# Patient Record
Sex: FEMALE | Race: WHITE | NOT HISPANIC OR LATINO | Employment: OTHER | URBAN - METROPOLITAN AREA
[De-identification: names, ages, dates, MRNs, and addresses within clinical notes are randomized per-mention and may not be internally consistent; named-entity substitution may affect disease eponyms.]

---

## 2017-02-20 ENCOUNTER — OFFICE VISIT (OUTPATIENT)
Dept: OBGYN CLINIC | Facility: HOSPITAL | Age: 82
End: 2017-02-20
Payer: COMMERCIAL

## 2017-03-20 ENCOUNTER — GENERIC CONVERSION - ENCOUNTER (OUTPATIENT)
Dept: OTHER | Facility: OTHER | Age: 82
End: 2017-03-20

## 2017-03-25 ENCOUNTER — ALLSCRIPTS OFFICE VISIT (OUTPATIENT)
Dept: OTHER | Facility: OTHER | Age: 82
End: 2017-03-25

## 2017-03-25 DIAGNOSIS — R26.9 ABNORMALITY OF GAIT AND MOBILITY: ICD-10-CM

## 2017-03-30 ENCOUNTER — APPOINTMENT (OUTPATIENT)
Dept: PHYSICAL THERAPY | Facility: CLINIC | Age: 82
End: 2017-03-30
Payer: COMMERCIAL

## 2017-03-30 ENCOUNTER — GENERIC CONVERSION - ENCOUNTER (OUTPATIENT)
Dept: OTHER | Facility: OTHER | Age: 82
End: 2017-03-30

## 2017-03-30 PROCEDURE — 97162 PT EVAL MOD COMPLEX 30 MIN: CPT

## 2017-04-03 ENCOUNTER — GENERIC CONVERSION - ENCOUNTER (OUTPATIENT)
Dept: OTHER | Facility: OTHER | Age: 82
End: 2017-04-03

## 2017-04-03 LAB
LEFT EYE DIABETIC RETINOPATHY: NORMAL
RIGHT EYE DIABETIC RETINOPATHY: NORMAL

## 2017-04-05 ENCOUNTER — APPOINTMENT (OUTPATIENT)
Dept: PHYSICAL THERAPY | Facility: CLINIC | Age: 82
End: 2017-04-05
Payer: COMMERCIAL

## 2017-04-05 PROCEDURE — 97116 GAIT TRAINING THERAPY: CPT

## 2017-04-05 PROCEDURE — 97112 NEUROMUSCULAR REEDUCATION: CPT

## 2017-04-07 ENCOUNTER — APPOINTMENT (OUTPATIENT)
Dept: PHYSICAL THERAPY | Facility: CLINIC | Age: 82
End: 2017-04-07
Payer: COMMERCIAL

## 2017-04-10 ENCOUNTER — APPOINTMENT (OUTPATIENT)
Dept: PHYSICAL THERAPY | Facility: CLINIC | Age: 82
End: 2017-04-10
Payer: COMMERCIAL

## 2017-04-10 PROCEDURE — 97116 GAIT TRAINING THERAPY: CPT

## 2017-04-10 PROCEDURE — 97110 THERAPEUTIC EXERCISES: CPT

## 2017-04-12 ENCOUNTER — APPOINTMENT (OUTPATIENT)
Dept: PHYSICAL THERAPY | Facility: CLINIC | Age: 82
End: 2017-04-12
Payer: COMMERCIAL

## 2017-04-12 PROCEDURE — 97116 GAIT TRAINING THERAPY: CPT

## 2017-04-12 PROCEDURE — 97110 THERAPEUTIC EXERCISES: CPT

## 2017-04-17 ENCOUNTER — APPOINTMENT (OUTPATIENT)
Dept: PHYSICAL THERAPY | Facility: CLINIC | Age: 82
End: 2017-04-17
Payer: COMMERCIAL

## 2017-04-17 PROCEDURE — 97110 THERAPEUTIC EXERCISES: CPT

## 2017-04-17 PROCEDURE — 97116 GAIT TRAINING THERAPY: CPT

## 2017-04-17 PROCEDURE — 97112 NEUROMUSCULAR REEDUCATION: CPT

## 2017-04-19 ENCOUNTER — APPOINTMENT (OUTPATIENT)
Dept: PHYSICAL THERAPY | Facility: CLINIC | Age: 82
End: 2017-04-19
Payer: COMMERCIAL

## 2017-04-24 ENCOUNTER — APPOINTMENT (OUTPATIENT)
Dept: PHYSICAL THERAPY | Facility: CLINIC | Age: 82
End: 2017-04-24
Payer: COMMERCIAL

## 2017-04-24 PROCEDURE — 97112 NEUROMUSCULAR REEDUCATION: CPT

## 2017-04-24 PROCEDURE — 97110 THERAPEUTIC EXERCISES: CPT

## 2017-04-26 ENCOUNTER — APPOINTMENT (OUTPATIENT)
Dept: PHYSICAL THERAPY | Facility: CLINIC | Age: 82
End: 2017-04-26
Payer: COMMERCIAL

## 2017-04-26 PROCEDURE — 97110 THERAPEUTIC EXERCISES: CPT

## 2017-04-26 PROCEDURE — 97164 PT RE-EVAL EST PLAN CARE: CPT

## 2017-05-01 ENCOUNTER — APPOINTMENT (OUTPATIENT)
Dept: PHYSICAL THERAPY | Facility: CLINIC | Age: 82
End: 2017-05-01
Payer: COMMERCIAL

## 2017-05-01 DIAGNOSIS — E11.9 TYPE 2 DIABETES MELLITUS WITHOUT COMPLICATIONS (HCC): ICD-10-CM

## 2017-05-01 PROCEDURE — 97110 THERAPEUTIC EXERCISES: CPT

## 2017-05-01 PROCEDURE — 97116 GAIT TRAINING THERAPY: CPT

## 2017-05-12 ENCOUNTER — LAB CONVERSION - ENCOUNTER (OUTPATIENT)
Dept: OTHER | Facility: OTHER | Age: 82
End: 2017-05-12

## 2017-05-12 LAB
BUN SERPL-MCNC: 15 MG/DL (ref 7–25)
BUN/CREA RATIO (HISTORICAL): 16 (CALC) (ref 6–22)
CALCIUM SERPL-MCNC: 9.3 MG/DL (ref 8.6–10.4)
CHLORIDE SERPL-SCNC: 101 MMOL/L (ref 98–110)
CO2 SERPL-SCNC: 31 MMOL/L (ref 20–31)
CREAT SERPL-MCNC: 0.93 MG/DL (ref 0.6–0.88)
DEPRECATED RDW RBC AUTO: 14.3 % (ref 11–15)
EGFR AFRICAN AMERICAN (HISTORICAL): 60 ML/MIN/1.73M2
EGFR-AMERICAN CALC (HISTORICAL): 52 ML/MIN/1.73M2
GLUCOSE (HISTORICAL): 126 MG/DL (ref 65–99)
HBA1C MFR BLD HPLC: 6 % OF TOTAL HGB
HCT VFR BLD AUTO: 42.8 % (ref 35–45)
HGB BLD-MCNC: 14 G/DL (ref 11.7–15.5)
MCH RBC QN AUTO: 29.5 PG (ref 27–33)
MCHC RBC AUTO-ENTMCNC: 32.8 G/DL (ref 32–36)
MCV RBC AUTO: 89.8 FL (ref 80–100)
PLATELET # BLD AUTO: 185 THOUSAND/UL (ref 140–400)
PMV BLD AUTO: 9.7 FL (ref 7.5–12.5)
POTASSIUM SERPL-SCNC: 4.4 MMOL/L (ref 3.5–5.3)
RBC # BLD AUTO: 4.76 MILLION/UL (ref 3.8–5.1)
SODIUM SERPL-SCNC: 139 MMOL/L (ref 135–146)
WBC # BLD AUTO: 6.5 THOUSAND/UL (ref 3.8–10.8)

## 2017-05-15 ENCOUNTER — LAB CONVERSION - ENCOUNTER (OUTPATIENT)
Dept: OTHER | Facility: OTHER | Age: 82
End: 2017-05-15

## 2017-05-15 ENCOUNTER — OFFICE VISIT (OUTPATIENT)
Dept: OBGYN CLINIC | Facility: HOSPITAL | Age: 82
End: 2017-05-15
Payer: COMMERCIAL

## 2017-05-15 LAB
CREATININE, RANDOM URINE (HISTORICAL): 107 MG/DL (ref 20–320)
MAGNESIUM, UR (HISTORICAL): 0.7 MG/DL
MICROALBUMIN/CREATININE RATIO (HISTORICAL): 7 MCG/MG CREAT

## 2017-05-16 ENCOUNTER — GENERIC CONVERSION - ENCOUNTER (OUTPATIENT)
Dept: OTHER | Facility: OTHER | Age: 82
End: 2017-05-16

## 2017-06-28 ENCOUNTER — OFFICE VISIT (OUTPATIENT)
Dept: OBGYN CLINIC | Facility: HOSPITAL | Age: 82
End: 2017-06-28
Payer: COMMERCIAL

## 2017-07-03 ENCOUNTER — OFFICE VISIT (OUTPATIENT)
Dept: OBGYN CLINIC | Facility: HOSPITAL | Age: 82
End: 2017-07-03
Payer: COMMERCIAL

## 2017-08-07 ENCOUNTER — GENERIC CONVERSION - ENCOUNTER (OUTPATIENT)
Dept: OTHER | Facility: OTHER | Age: 82
End: 2017-08-07

## 2017-08-14 ENCOUNTER — OFFICE VISIT (OUTPATIENT)
Dept: OBGYN CLINIC | Facility: HOSPITAL | Age: 82
End: 2017-08-14
Payer: COMMERCIAL

## 2017-08-15 ENCOUNTER — OFFICE VISIT (OUTPATIENT)
Dept: OBGYN CLINIC | Facility: HOSPITAL | Age: 82
End: 2017-08-15
Payer: COMMERCIAL

## 2017-09-07 ENCOUNTER — GENERIC CONVERSION - ENCOUNTER (OUTPATIENT)
Dept: OTHER | Facility: OTHER | Age: 82
End: 2017-09-07

## 2017-09-20 ENCOUNTER — LAB CONVERSION - ENCOUNTER (OUTPATIENT)
Dept: OTHER | Facility: OTHER | Age: 82
End: 2017-09-20

## 2017-09-20 LAB — HBA1C MFR BLD HPLC: 6 % OF TOTAL HGB

## 2017-10-04 ENCOUNTER — GENERIC CONVERSION - ENCOUNTER (OUTPATIENT)
Dept: OTHER | Facility: OTHER | Age: 82
End: 2017-10-04

## 2017-10-16 ENCOUNTER — ALLSCRIPTS OFFICE VISIT (OUTPATIENT)
Dept: OTHER | Facility: OTHER | Age: 82
End: 2017-10-16

## 2017-10-16 ENCOUNTER — HOSPITAL ENCOUNTER (OUTPATIENT)
Dept: RADIOLOGY | Facility: HOSPITAL | Age: 82
Discharge: HOME | End: 2017-10-16
Attending: ORTHOPAEDIC SURGERY
Payer: COMMERCIAL

## 2017-10-16 DIAGNOSIS — M25.561 PAIN IN RIGHT KNEE: ICD-10-CM

## 2017-10-16 PROCEDURE — 73560 X-RAY EXAM OF KNEE 1 OR 2: CPT

## 2017-10-17 NOTE — PROGRESS NOTES
Assessment  1  Right knee pain (719 46) (M25 561)   2  Closed nondisplaced transverse fracture of right patella, initial encounter (822 0)   (P99 394Y)    Plan  Closed nondisplaced transverse fracture of right patella, initial encounter    · Follow-up visit in 3 weeks Evaluation and Treatment  Follow-up  Status: Hold For -  Scheduling  Requested for: 43RPL1564  Right knee pain    · * XR KNEE 3 VW LEFT NON INJURY; Status:Active; Requested for:16Oct2017;    · * XR KNEE 4+ VW RIGHT INJURY; Status:Active; Requested for:16Oct2017;     Discussion/Summary    Patient will be treated with nonoperative care  A detailed note to the nursing facility was provided at the findings the treatment  She will maintain the knee immobilizer for weeks, removing it for high general care and for showering, when she is out of the knee immobilizer she should be nonweightbearing and try to keep the knee straight  She otherwise can bear weight with knee immobilizer on  We will check her with new x-rays in 3 weeks and hopefully begin physical therapy  History of Present Illness  Patient presents with a chief complaint of right knee pain  She fell 10 days ago landing on the right knee and was seen at [facility] diagnosed with patella fracture and placed in a knee immobilizer  She presents today from her assisted living home in the knee immobilizer complaining of minimal pain and inability to raise the leg off of the chair/  She denies numbness or tingling or fevers and chills associated with this, localizes the pain to the patella and states is mild in intensity intermittent in timing      Review of Systems    Constitutional: No fever, no chills, feels well, no tiredness, no recent weight gain or loss  Eyes: No complaints of eyesight problems, no red eyes  ENT: no loss of hearing, no nosebleeds, no sore throat  Cardiovascular: No complaints of chest pain, no palpitations, no leg claudication or lower extremity edema  Respiratory: no compliants of shortness of breath, no wheezing, no cough  Gastrointestinal: no complaints of abdominal pain, no constipation, no nausea or diarrhea, no vomiting, no bloody stools  Genitourinary: no complaints of dysuria, no incontinence  Musculoskeletal: as noted in HPI  Integumentary: no complaints of skin rash or lesion, no itching or dry skin, no skin wounds  Neurological: no complaints of headache, no confusion, no numbness or tingling, no dizziness  Endocrine: No complaints of muscle weakness, no feelings of weakness, no frequent urination, no excessive thirst    Psychiatric: No suicidal thoughts, no anxiety, no feelings of depression  ROS reviewed  Active Problems  1  Balance problem (781 99) (R26 89)   2  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 (250 60,357 2) (E11 40)   3  Gait disturbance (781 2) (R26 9)   4  Hypercholesterolemia (272 0) (E78 00)   5  Non-toxic uninodular goiter (241 0) (E04 1)   6  Osteoporosis (733 00) (M81 0)   7  Psoriasis (696 1) (L40 9)   8  Right knee pain (719 46) (M25 561)   9  Urinary frequency (788 41) (R35 0)   10   Vitamin D deficiency (268 9) (E55 9)    Past Medical History   · History of Ankle injury, left, initial encounter (959 7) (E28 078P)   · History of Anxiety (300 00) (F41 9)   · History of Breast pain, left (611 71) (N64 4)   · History of Compression fracture of L1 lumbar vertebra (805 4) (S32 010A)   · History of Coronary Artery Disease (V12 59)   · History of Gout (274 9) (M10 9)   · History of abnormal weight loss (V13 89) (O51 168)   · History of depression (V11 8) (Z86 59)   · History of edema (V13 89) (Z30 654)   · History of hypertension (V12 59) (Z86 79)   · History of low back pain (V13 59) (Z87 39)   · History of Lung mass (786 6) (R91 8)   · History of Polyp of sigmoid colon (211 3) (D12 5)   · History of Reported Positive Skin Test For Tuberculosis    The active problems and past medical history were reviewed and updated today  Surgical History   · History of Cataract Surgery   · History of Colonoscopy (Fiberoptic)   · History of Surgery Excision Of Parotid Tumor/Gland   · History of Thyroid Surgery Benedict-Thyroidectomy    The surgical history was reviewed and updated today  Family History  Mother    · Family history of Diabetes Mellitus (V18 0)   · Family history of Myocardial Infarction Arrhythmias  Father    · Family history of Myocardial Infarction Arrhythmias  Brother    · Family history of CABG   · Family history of Myocardial Infarction Arrhythmias    The family history was reviewed and updated today  Social History   · Denied: Alcohol Use (History)   · Death In The Family Spouse   ·   01   · Denied: Drug Use (305 90)   · Former smoker (V15 82) (C81 377)   · quit age 36   · Marital History -   The social history was reviewed and updated today  Current Meds   1  Acetaminophen 325 MG Oral Tablet; Therapy: (Recorded:13Oma8321) to Recorded   2  AmLODIPine Besylate 5 MG Oral Tablet; Therapy: (Recorded:20Zuy5249) to Recorded   3  Aspirin 81 MG CAPS; Therapy: (Recorded:06Nsm4402) to Recorded   4  Aspirin 81 MG Oral Tablet Delayed Release; Take 1 daily; Therapy: 53NCT1401 to Recorded   5  Combigan SOLN;   Therapy: (Recorded:81Irn6614) to Recorded   6  Folic Acid 1 MG Oral Tablet; Therapy: (Recorded:79Cnb5321) to Recorded   7  Meclizine HCl - 25 MG Oral Tablet; 1/2 tablet daily at bedtime; Therapy: 86CPB2573 to (Evaluate:64Yvo8016)  Requested for: 07PFX9044; Last   Rx:63Ike5034 Ordered   8  MiraLax Oral Powder (Polyethylene Glycol 3350); Therapy: (Recorded:25Sfn3820) to Recorded   9  Multivitamins CAPS; Therapy: (Recorded:33Qlq4821) to Recorded   10  Remeron 15 MG Oral Tablet (Mirtazapine); Therapy: (Recorded:67Rwy0581) to Recorded   11  Travatan Z 0 004 % Ophthalmic Solution; Therapy: (Recorded:88Hbq7522) to Recorded   12   Travatan Z 0 004 % Ophthalmic Solution; Therapy: 26Jyw0224 to (Evaluate:19Oct2014) Recorded   13  Ultram 50 MG Oral Tablet (TraMADol HCl); Therapy: (Recorded:16Oct2017) to Recorded   14  Vitamin D TABS; Therapy: (Recorded:32Poi7453) to Recorded    The medication list was reviewed and updated today  Allergies  1  Penicillins   2  Glucophage TABS   3  Lipitor TABS   4  Lisinopril TABS    Vitals   Recorded: 50CEP1767 11:32AM   Heart Rate 72   Systolic 214   Diastolic 66   Height Unobtainable Yes   Weight Unobtainable Yes     Physical Exam    Right Knee: Appearance: no deformity,-- no joint dislocation,-- no ecchymosis,-- no effusion-- and-- no erythema  Tenderness: diffuse anterior knee, but-- not over the lateral joint line-- and-- not over the medial joint line  Palpatory findings include no crepitus-- and-- no patella ballottement  ROM: Deferred  Motor: Deferred  -- Unable to do straight leg raise  Constitutional - General appearance: Normal  appearance reflects stated age  Musculoskeletal - Gait and station: Abnormal  Gait evaluation demonstrated In a wheelchair, but-- weight bearing on the right  Cardiovascular - Pulses: Normal    Skin - Skin and subcutaneous tissue: Normal    Neurologic - Sensation: Normal    Psychiatric - Mood and affect: Normal       Results/Data  I personally reviewed the films/images/results in the office today  My interpretation follows  X-ray Review 2 views right knee obtained today show a nondisplaced fracture of the patella  Future Appointments    Date/Time Provider Specialty Site   10/18/2017 03:30 PM BREEZY Davies   Cardiology [facility]     Signatures   Electronically signed by : BREEZY Epstein ; Oct 16 2017 11:51AM EST                       (Author)

## 2017-11-06 ENCOUNTER — HOSPITAL ENCOUNTER (OUTPATIENT)
Dept: RADIOLOGY | Facility: HOSPITAL | Age: 82
Discharge: HOME | End: 2017-11-06
Attending: ORTHOPAEDIC SURGERY
Payer: COMMERCIAL

## 2017-11-06 ENCOUNTER — GENERIC CONVERSION - ENCOUNTER (OUTPATIENT)
Dept: OTHER | Facility: OTHER | Age: 82
End: 2017-11-06

## 2017-11-06 ENCOUNTER — ALLSCRIPTS OFFICE VISIT (OUTPATIENT)
Dept: OTHER | Facility: OTHER | Age: 82
End: 2017-11-06

## 2017-11-06 DIAGNOSIS — S82.034A: ICD-10-CM

## 2017-11-06 PROCEDURE — 73560 X-RAY EXAM OF KNEE 1 OR 2: CPT

## 2017-11-29 ENCOUNTER — ALLSCRIPTS OFFICE VISIT (OUTPATIENT)
Dept: OTHER | Facility: OTHER | Age: 82
End: 2017-11-29

## 2017-11-30 NOTE — PROGRESS NOTES
Assessment  Assessed    1  Hypercholesterolemia (272 0) (E78 00)    Plan  Balance problem, Closed nondisplaced transverse fracture of right patella, initialencounter, 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, Health Maintenance,Hypercholesterolemia    · Follow-up PRN Evaluation and Treatment  Follow-up  Status: Complete  Done:29Nov2017 03:21PM   Ordered; For: Balance problem, Closed nondisplaced transverse fracture of right patella, initial encounter, 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, Health Maintenance, Hypercholesterolemia; Ordered By: Janene Mccloud Performed:  Due: 88BAK5486   · (1) CBC/PLT/DIFF; Status:Active; Requested for:29Nov2018; Perform:[facility]; MVH:46WTS0305;QQWSXBQ; For:Balance problem, Closed nondisplaced transverse fracture of right patella, initial encounter, 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, Health Maintenance, Hypercholesterolemia; Ordered By:Nico Mayer;   · (1) COMPREHENSIVE METABOLIC PANEL; Status:Active; Requested for:29Nov2018; Perform:[facility]; CHV:74FGD3519;XUJZPYJ; For:Balance problem, Closed nondisplaced transverse fracture of right patella, initial encounter, 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, Health Maintenance, Hypercholesterolemia; Ordered By:Nico Mayer;   · (1) HEMOGLOBIN A1C; Status:Active; Requested for:29Nov2018; Perform:[facility]; ARSEN:60PFQ4274;XOVRUHZ;OMLXEYI, Closed nondisplaced transverse fracture of right patella, initial encounter, 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, Health Maintenance, Hypercholesterolemia; Ordered By:Dayne Mayer;    Discussion/Summary  Cardiology Discussion Summary Free Text Note Form [facility]:   80-year-old woman continues to extraordinarily well  Mentally stay still sharp  We have treated her in the past for some elevated cholesterol levels   However she's developed no vascular disease apparently she had problems with statins  She basically is taking no cardiac drugs  We will see her in one year or when necessary  Her blood work is being done by her family physician  seen October 20, 2015  Have nothing to add to the care of this 89 for wound totally normal blood work sent for elevated cholesterol  No vascular disease  She has a high HDL  She is losing a little bit of weight  She is free he thinks because she is concerned about her blood sugar going to high  seen October 3, 2016  She continues to be doing extraordinarily well for her age  Nothing to change  Her hemoglobin A1c is 6 5  No medication  seen November 29, 2017  No cardiac issues  We will see her on a p r n  basis  Mentally amazingly stable  Chief Complaint  Chief Complaint Free Text Note Form: Patient presents for a yearly follow up  She has no complaints today  History of Present Illness  Cardiology HPI Free Text Note Form [facility]: Patient seen November 29, 2017  Is almost 40-year-old woman who has enjoyed extremely good health is visiting today  She had been on some statin drug  She is having problems with that  She is on no cardiac drugs  She has no cardiac symptoms  Mentally she is great  I will see her on a p r n  basis  She has family physician  Her A1c is 6  Her lab values in general are amazingly normal       Review of Systems  Cardiology Female ROS:    Cardiac: No complaints of chest pain, no palpitations, no fainting  Skin: No complaints of nonhealing sores or skin rash  Psychological: No complaints of feeling depressed, anxiety, panic attacks, or difficulty concentrating  Active Problems  Problems    1  Balance problem (781 99) (R26 89)   2  Closed nondisplaced transverse fracture of right patella, initial encounter (822 0) (S82 034A)   3  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 (250 60,357 2) (E11 40)   4  Gait disturbance (781 2) (R26 9)   5  Hypercholesterolemia (272 0) (E78 00)   6   Non-toxic uninodular goiter (241 0) (E04 1)   7  Osteoporosis (733 00) (M81 0)   8  Psoriasis (696 1) (L40 9)   9  Right knee pain (719 46) (M25 561)   10  Urinary frequency (788 41) (R35 0)   11  Vitamin D deficiency (268 9) (E55 9)    Past Medical History  Problems    1  History of Ankle injury, left, initial encounter (959 7) (D04 057V)   2  History of Anxiety (300 00) (F41 9)   3  History of Breast pain, left (611 71) (N64 4)   4  History of Compression fracture of L1 lumbar vertebra (805 4) (S32 010A)   5  History of Coronary Artery Disease (V12 59)   6  History of Gout (274 9) (M10 9)   7  History of abnormal weight loss (V13 89) (Z87 898)   8  History of depression (V11 8) (Z86 59)   9  History of edema (V13 89) (Z87 898)   10  History of hypertension (V12 59) (Z86 79)   11  History of low back pain (V13 59) (Z87 39)   12  History of Lung mass (786 6) (R91 8)   13  History of Polyp of sigmoid colon (211 3) (D12 5)   14  History of Reported Positive Skin Test For Tuberculosis    Surgical History  Problems    1  History of Cataract Surgery   2  History of Colonoscopy (Fiberoptic)   3  History of Surgery Excision Of Parotid Tumor/Gland   4  History of Thyroid Surgery Benedict-Thyroidectomy    Family History  Mother    1  Family history of Diabetes Mellitus (V18 0)   2  Family history of Myocardial Infarction Arrhythmias  Father    3  Family history of Myocardial Infarction Arrhythmias  Brother    4  Family history of CABG   5  Family history of Myocardial Infarction Arrhythmias    Social History  Problems    · Denied: Alcohol Use (History)   · Death In The Family Spouse   · Denied: Drug Use (305 90)   · Former smoker (X19 60) (N37 765)   · Marital History -   Social History Reviewed: The social history was reviewed and is unchanged  Current Meds   1  Acetaminophen 325 MG Oral Tablet; Therapy: (Recorded:16Oct2017) to Recorded   2  Aspirin 81 MG CAPS; Therapy: (Recorded:16Oct2017) to Recorded   3  MiraLax Oral Powder;  Therapy: (Recorded:16Oct2017) to Recorded   4  Multivitamins CAPS; Therapy: (Recorded:25Oct2016) to Recorded   5  Remeron 15 MG Oral Tablet; Therapy: (Recorded:16Oct2017) to Recorded   6  Travatan Z 0 004 % Ophthalmic Solution; Therapy: (Recorded:16Oct2017) to Recorded    Allergies  Medication    1  Penicillins   2  Glucophage TABS   3  Lipitor TABS   4  Lisinopril TABS    Vitals  Vital Signs    Recorded: 61WFH8821 02:50PM   Heart Rate 88   Systolic 881, LUE, Sitting   Diastolic 78, LUE, Sitting   Height 5 ft    Weight 101 lb 4 oz   BMI Calculated 19 77   BSA Calculated 1 4   O2 Saturation 97       Physical Exam   Constitutional  General appearance: Abnormal  -- Lost some weight but otherwise great  Pulmonary  Auscultation of lungs: Clear to auscultation, no rales, no rhonchi, no wheezing, good air movement  Cardiovascular  Auscultation of heart: Normal rate and rhythm, normal S1 and S2, no murmurs     Examination of extremities for edema and/or varicosities: Normal    Psychiatric - Orientation to person, place, and time: Normal -- Mood and affect: Normal       Signatures   Electronically signed by : BREEZY Clarke ; Nov 29 2017  3:25PM EST                       (Author)

## 2018-01-10 NOTE — PROGRESS NOTES
Assessment    1  Low back pain (724 2) (M54 5)   2  Compression fracture of L1 lumbar vertebra (805 4) (S32 010A)   3  Osteoporosis (733 00) (M81 0)    Plan  Low back pain    · Physical Therapy Referral Other Physician Referral  Consult  Status: Hold For -  Scheduling  Requested for: 65EWZ0101  are Referring to a non- Preferred Provider : Established Patient  Care Summary provided  : Yes    Discussion/Summary    1  Low back pain / H/o L1 compression Fx - take Tylenol PRN for pain  Patient was referred to physical therapy  Recommended to call office if low back pain persists or worsens  2 Osteoporosis -discussed fall precautions with patient  Continue weightbearing exercise, calcium with vitamin D supplements  Followup with PCP Dr Darwin Grady  The patient was counseled regarding instructions for management, risk factor reductions, risks and benefits of treatment options, importance of compliance with treatment  Possible side effects of new medications were reviewed with the patient/guardian today  The treatment plan was reviewed with the patient/guardian  The patient/guardian understands and agrees with the treatment plan      Chief Complaint  Patient is here complaining of lower back pain that started after falling at the end of November  Patient states she doesn't know how she fell, but she fell on her back  History of Present Illness  HPI: Patient is 43-year-old female who presents to the office c/o low back pain since fall in November 2015  She was admitted to [facility], diagnosed with L1 compression fracture  Patient is interested in starting physical therapy  She takes Tylenol PRN with some improvement in pain  No numbness, tingling in lower extremities  No abdominal pain, no constipation  Denies urinary symptoms  Patient is independent with all ADLs, continues to drive a car  Review of Systems    Constitutional: no fever and no chills     Cardiovascular: no chest pain, no intermittent leg claudication, no palpitations and no lower extremity edema  Respiratory: no shortness of breath, no cough and no wheezing  Gastrointestinal: no abdominal pain, no nausea, no vomiting, no constipation, no diarrhea and no blood in stools  Genitourinary: no dysuria and no pelvic pain  Musculoskeletal: no joint swelling    The patient presents with complaints of lower back arthralgias  Integumentary: no rashes and no itching  Neurological: no headache and no dizziness  Active Problems    1  Abnormal loss of weight (783 21) (R63 4)   2  Breast pain, left (611 71) (N64 4)   3  Controlled diabetes mellitus type II without complication (712 07) (R58 2)   4  Depression screening (V79 0) (Z13 89)   5  Hypercholesterolemia (272 0) (E78 0)   6  Hypertension (401 9) (I10)   7  Lung mass (786 6) (R91 8)   8  Need for influenza vaccination (V04 81) (Z23)   9  Need for pneumococcal vaccination (V03 82) (Z23)   10  Non-toxic uninodular goiter (241 0) (E04 1)   11  Osteoporosis (733 00) (M81 0)   12  Psoriasis (696 1) (L40 9)   13  Screening for genitourinary condition (V81 6) (Z13 89)   14  Vitamin D deficiency (268 9) (E55 9)    Past Medical History    1  History of Ankle injury, left, initial encounter (959 7) (V17 537W)   2  History of Anxiety (300 00) (F41 9)   3  History of Coronary Artery Disease (V12 59)   4  History of Gout (274 9) (M10 9)   5  History of acute bronchitis (V12 69) (Z87 09)   6  History of depression (V11 8) (Z86 59)   7  History of edema (V13 89) (Z87 898)   8  History of vertigo (V12 49) (Z87 898)   9  History of Polyp of sigmoid colon (211 3) (D12 5)   10  History of Pure hypercholesterolemia (272 0) (E78 0)   11  History of Reported Positive Skin Test For Tuberculosis  Active Problems And Past Medical History Reviewed: The active problems and past medical history were reviewed and updated today  Family History    1  Family history of Diabetes Mellitus (V18 0)   2  Family history of Myocardial Infarction Arrhythmias    3  Family history of Myocardial Infarction Arrhythmias    4  Family history of CABG   5  Family history of Myocardial Infarction Arrhythmias    Social History    · Denied: Alcohol Use (History)   · Death In The Family Spouse   ·   01   · Denied: Drug Use (305 90)   · Former smoker (V11 82) (F95 191)   · quit age 36   · Marital History -   The social history was reviewed and updated today  Surgical History    1  History of Cataract Surgery   2  History of Colonoscopy (Fiberoptic)   3  History of Surgery Excision Of Parotid Tumor/Gland   4  History of Thyroid Surgery Benedict-Thyroidectomy    Current Meds   1  Aspirin 81 MG Oral Tablet Delayed Release; Take 1 daily; Therapy: 87BXK0994 to Recorded   2  Meclizine HCl - 25 MG Oral Tablet; 1/2 tablet daily at bedtime; Therapy: 18NXZ1908 to (Evaluate:2014)  Requested for: 73RVX9665; Last   Rx:60Owx5318 Ordered   3  Multivitamins CAPS; Therapy: ((23) 1412-7927) to Recorded   4  Travatan Z 0 004 % Ophthalmic Solution; Therapy: 39Xeq9050 to (Evaluate:2014) Recorded   5  Vitamin D TABS; Therapy: (Recorded:2014) to Recorded    The medication list was reviewed and updated today  Allergies    1  Penicillins   2  Glucophage TABS   3  Lipitor TABS   4  Lisinopril TABS    Vitals   Recorded: 94IWJ8976 09:19AM   Temperature 97 5 F   Heart Rate 82   Respiration 16   Systolic 282   Diastolic 70   Height 5 ft    Weight 104 lb 4 00 oz   BMI Calculated 20 36   BSA Calculated 1 41   O2 Saturation 99   Pain Scale 8     Physical Exam    Constitutional thin elderly female in no acute distress  Eyes   Conjunctiva and lids: No swelling, erythema or discharge  Pupils and irises: Equal, round and reactive to light  Pulmonary   Respiratory effort: No increased work of breathing or signs of respiratory distress  Auscultation of lungs: Clear to auscultation      Cardiovascular Auscultation of heart: Normal rate and rhythm, normal S1 and S2, without murmurs  Examination of extremities for edema and/or varicosities: Normal     Abdomen   Abdomen: Non-tender, no masses  Liver and spleen: No hepatomegaly or splenomegaly  Musculoskeletal   Gait and station: Normal     Digits and nails: Normal without clubbing or cyanosis  Inspection/palpation of joints, bones, and muscles: Abnormal    Low back: decreased ROM with flexion, extension, lateral bending  No spinal or paraspinal tenderness  Thoracic kyphosis  Skin   Skin and subcutaneous tissue: Normal without rashes or lesions  Psychiatric   Orientation to person, place, and time: Normal     Mood and affect: Normal          Future Appointments    Date/Time Provider Specialty Site   05/05/2016 03:40 PM BREEZY Carlson   Family Medicine [facility]     Signatures   Electronically signed by : BREEZY Bonilla ; Jan 19 2016  9:57AM EST                       (Author)

## 2018-01-12 VITALS
OXYGEN SATURATION: 97 % | DIASTOLIC BLOOD PRESSURE: 78 MMHG | WEIGHT: 101.25 LBS | BODY MASS INDEX: 19.88 KG/M2 | SYSTOLIC BLOOD PRESSURE: 122 MMHG | HEIGHT: 60 IN | HEART RATE: 88 BPM

## 2018-01-12 NOTE — CONSULTS
Chief Complaint  Patient presents for a yearly follow up  She has no complaints today  History of Present Illness  Patient seen November 29, 2017  Is almost 80-year-old woman who has enjoyed extremely good health is visiting today  She had been on some statin drug  She is having problems with that  She is on no cardiac drugs  She has no cardiac symptoms  Mentally she is great  I will see her on a p r n  basis  She has family physician  Her A1c is 6  Her lab values in general are amazingly normal       Review of Systems      Cardiac: No complaints of chest pain, no palpitations, no fainting  Skin: No complaints of nonhealing sores or skin rash  Psychological: No complaints of feeling depressed, anxiety, panic attacks, or difficulty concentrating  Active Problems    1  Balance problem (781 99) (R26 89)   2  Closed nondisplaced transverse fracture of right patella, initial encounter (822 0)   (S82 034A)   3  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 (250 60,357 2) (E11 40)   4  Gait disturbance (781 2) (R26 9)   5  Hypercholesterolemia (272 0) (E78 00)   6  Non-toxic uninodular goiter (241 0) (E04 1)   7  Osteoporosis (733 00) (M81 0)   8  Psoriasis (696 1) (L40 9)   9  Right knee pain (719 46) (M25 561)   10  Urinary frequency (788 41) (R35 0)   11   Vitamin D deficiency (268 9) (E55 9)    Past Medical History    · History of Ankle injury, left, initial encounter (959 7) (B83 974Z)   · History of Anxiety (300 00) (F41 9)   · History of Breast pain, left (611 71) (N64 4)   · History of Compression fracture of L1 lumbar vertebra (805 4) (S32 010A)   · History of Coronary Artery Disease (V12 59)   · History of Gout (274 9) (M10 9)   · History of abnormal weight loss (V13 89) (V67 884)   · History of depression (V11 8) (Z86 59)   · History of edema (V13 89) (W18 907)   · History of hypertension (V12 59) (Z86 79)   · History of low back pain (V13 59) (Z87 39)   · History of Lung mass (786 6) (R91 8)   · History of Polyp of sigmoid colon (211 3) (D12 5)   · History of Reported Positive Skin Test For Tuberculosis    Surgical History    · History of Cataract Surgery   · History of Colonoscopy (Fiberoptic)   · History of Surgery Excision Of Parotid Tumor/Gland   · History of Thyroid Surgery Benedict-Thyroidectomy    Family History    · Family history of Diabetes Mellitus (V18 0)   · Family history of Myocardial Infarction Arrhythmias    · Family history of Myocardial Infarction Arrhythmias    · Family history of CABG   · Family history of Myocardial Infarction Arrhythmias    Social History    · Denied: Alcohol Use (History)   · Death In The Family Spouse   · Denied: Drug Use (305 90)   · Former smoker (V15 82) (L66 737)   · Marital History -   The social history was reviewed and is unchanged  Current Meds   1  Acetaminophen 325 MG Oral Tablet; Therapy: (Recorded:16Oct2017) to Recorded   2  Aspirin 81 MG CAPS; Therapy: (Recorded:16Oct2017) to Recorded   3  MiraLax Oral Powder; Therapy: (Recorded:16Oct2017) to Recorded   4  Multivitamins CAPS; Therapy: (Recorded:61Xgr0716) to Recorded   5  Remeron 15 MG Oral Tablet; Therapy: (Recorded:80Wfv2648) to Recorded   6  Travatan Z 0 004 % Ophthalmic Solution; Therapy: (Recorded:70Xxc7149) to Recorded    Allergies    1  Penicillins   2  Glucophage TABS   3  Lipitor TABS   4  Lisinopril TABS    Vitals   Recorded: [id number] 02:50PM   Heart Rate 88   Systolic 692, LUE, Sitting   Diastolic 78, LUE, Sitting   Height 5 ft    Weight 101 lb 4 oz   BMI Calculated 19 77   BSA Calculated 1 4   O2 Saturation 97     Physical Exam    Constitutional   General appearance: Abnormal   Lost some weight but otherwise great  Pulmonary   Auscultation of lungs: Clear to auscultation, no rales, no rhonchi, no wheezing, good air movement  Cardiovascular   Auscultation of heart: Normal rate and rhythm, normal S1 and S2, no murmurs      Examination of extremities for edema and/or varicosities: Normal     Psychiatric - Orientation to person, place, and time: Normal  Mood and affect: Normal       Assessment    1  Hypercholesterolemia (272 0) (E78 00)    Plan  Balance problem, Closed nondisplaced transverse fracture of right patella, initial  encounter, 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, Health Maintenance,  Hypercholesterolemia    · Follow-up PRN Evaluation and Treatment  Follow-up  Status: Complete  Done:  [id number] 03:21PM   Ordered; For: Balance problem, Closed nondisplaced transverse fracture of right patella, initial encounter, 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, Health Maintenance, Hypercholesterolemia; Ordered By: Patricia Guerrero Performed:  Due: [id number]   · (1) CBC/PLT/DIFF; Status:Active; Requested for:29Nov2018; Perform:[facility]; OEP:[id number]; For:Balance problem, Closed nondisplaced transverse fracture of right patella, initial encounter, 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, Health Maintenance, Hypercholesterolemia; Ordered By:Nico Mayer;   · (1) COMPREHENSIVE METABOLIC PANEL; Status:Active; Requested for:29Nov2018; Perform:[facility]; HSO:[id number]; For:Balance problem, Closed nondisplaced transverse fracture of right patella, initial encounter, 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, Health Maintenance, Hypercholesterolemia; Ordered By:Nico Mayer;   · (1) HEMOGLOBIN A1C; Status:Active; Requested for:29Nov2018; Perform:[facility]; XNL:[id number];BIMPWNP; For:Balance problem, Closed nondisplaced transverse fracture of right patella, initial encounter, 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, Health Maintenance, Hypercholesterolemia; Ordered By:Nahum Mayer;    Discussion/Summary    49-year-old woman continues to extraordinarily well  Mentally stay still sharp   We have treated her in the past for some elevated cholesterol levels  However she's developed no vascular disease apparently she had problems with statins  She basically is taking no cardiac drugs  We will see her in one year or when necessary  Her blood work is being done by her family physician    Patient seen October 20, 2015  Have nothing to add to the care of this 89 for wound totally normal blood work sent for elevated cholesterol  No vascular disease  She has a high HDL  She is losing a little bit of weight  She is free he thinks because she is concerned about her blood sugar going to high  Recent seen October 3, 2016  She continues to be doing extraordinarily well for her age  Nothing to change  Her hemoglobin A1c is 6 5  No medication    Patient seen November 29, 2017  No cardiac issues  We will see her on a p r n  basis  Mentally amazingly stable        Signatures   Electronically signed by : BREEZY Zamora ; Nov 29 2017  3:25PM EST                       (Author)

## 2018-01-13 VITALS — DIASTOLIC BLOOD PRESSURE: 66 MMHG | SYSTOLIC BLOOD PRESSURE: 154 MMHG | HEART RATE: 72 BPM

## 2018-01-14 VITALS
TEMPERATURE: 97.2 F | DIASTOLIC BLOOD PRESSURE: 66 MMHG | SYSTOLIC BLOOD PRESSURE: 124 MMHG | HEART RATE: 60 BPM | RESPIRATION RATE: 16 BRPM | WEIGHT: 103.01 LBS | HEIGHT: 60 IN | BODY MASS INDEX: 20.22 KG/M2

## 2018-01-15 NOTE — RESULT NOTES
Verified Results  (1) CBC/PLT/DIFF [id number] 09:29AM [facility] Ramirezbaldev     Test Name Result Flag Reference   WHITE BLOOD CELL COUNT 8 8 Thousand/uL  3 8-10 8   RED BLOOD CELL COUNT 4 64 Million/uL  3 80-5 10   HEMOGLOBIN 13 7 g/dL  11 7-15 5   HEMATOCRIT 42 3 %  35 0-45 0   MCV 91 3 fL  80 0-100 0   MCH 29 5 pg  27 0-33 0   MCHC 32 3 g/dL  32 0-36 0   RDW 14 4 %  11 0-15 0   PLATELET COUNT 788 Thousand/uL  140-400   MPV 9 2 fL  7 5-11 5   ABSOLUTE NEUTROPHILS 6098 cells/uL  8123-2402   ABSOLUTE LYMPHOCYTES 1602 cells/uL  850-3900   ABSOLUTE MONOCYTES 695 cells/uL  200-950   ABSOLUTE EOSINOPHILS 387 cells/uL     ABSOLUTE BASOPHILS 18 cells/uL  0-200   NEUTROPHILS 69 3 %     LYMPHOCYTES 18 2 %     MONOCYTES 7 9 %     EOSINOPHILS 4 4 %     BASOPHILS 0 2 %       (1) COMPREHENSIVE METABOLIC PANEL [id number] 13:34MX AbSpringshotTaiwo donalddarren JamesHeyzaps     Test Name Result Flag Reference   GLUCOSE 97 mg/dL  65-99   Fasting reference interval   UREA NITROGEN (BUN) 16 mg/dL  7-25   CREATININE 0 86 mg/dL  0 60-0 88   For patients >52years of age, the reference limit  for Creatinine is approximately 13% higher for people  identified as -American  eGFR NON-AFR   AMERICAN 57 mL/min/1 73m2 L > OR = 60   eGFR AFRICAN AMERICAN 66 mL/min/1 73m2  > OR = 60   BUN/CREATININE RATIO   8-52   NOT APPLICABLE (calc)   SODIUM 136 mmol/L  135-146   POTASSIUM 4 4 mmol/L  3 5-5 3   CHLORIDE 98 mmol/L     CARBON DIOXIDE 28 mmol/L  20-31   CALCIUM 9 5 mg/dL  8 6-10 4   PROTEIN, TOTAL 6 6 g/dL  6 1-8 1   ALBUMIN 3 9 g/dL  3 6-5 1   GLOBULIN 2 7 g/dL (calc)  1 9-3 7   ALBUMIN/GLOBULIN RATIO 1 4 (calc)  1 0-2 5   BILIRUBIN, TOTAL 0 8 mg/dL  0 2-1 2   ALKALINE PHOSPHATASE 69 U/L     AST 19 U/L  10-35   ALT 11 U/L  6-29     (1) LIPID PANEL, FASTING [id number] 09:Erika Lord     Test Name Result Flag Reference   CHOLESTEROL, TOTAL 245 mg/dL H 125-200   HDL CHOLESTEROL 62 mg/dL  > OR = 46   TRIGLICERIDES 85 mg/dL  <050   LDL-CHOLESTEROL 166 mg/dL (calc) H <130   Desirable range <100 mg/dL for patients with CHD or  diabetes and <70 mg/dL for diabetic patients with  known heart disease  CHOL/HDLC RATIO 4 0 (calc)  < OR = 5 0   NON HDL CHOLESTEROL 183 mg/dL (calc) H    Target for non-HDL cholesterol is 30 mg/dL higher than   LDL cholesterol target  (Q) TSH, 3RD GENERATION [id number] 09:29AM Abandria Code Rebel     Test Name Result Flag Reference   TSH 1 17 mIU/L  0 40-4 50     (Q) HEMOGLOBIN A1c [id number] 09:29AM [employer]   REPORT COMMENT:  FASTING:YES  PATIENT UNABLE TO VOID; ADVISED TO RETURN FOR COLLECTION  Test Name Result Flag Reference   HEMOGLOBIN A1c 6 3 % of total Hgb H <5 7   According to ADA guidelines, hemoglobin A1c <7 0%  represents optimal control in non-pregnant diabetic  patients  Different metrics may apply to specific  patient populations  Standards of Medical Care in    Diabetes Care  2013;36:s11-s66     For the purpose of screening for the presence of  diabetes  <5 7%       Consistent with the absence of diabetes  5 7-6 4%    Consistent with increased risk for diabetes              (prediabetes)  >or=6 5%    Consistent with diabetes     This assay result is consistent with a higher risk  of diabetes  Currently, no consensus exists for use of hemoglobin  A1c for diagnosis of diabetes for children

## 2018-01-16 NOTE — RESULT NOTES
Verified Results  (Q) MICROALBUMIN, RANDOM URINE (W/CREATININE) [id number] 09:26AM Nena Murillo   REPORT COMMENT:  SPLIT 05/11/2017 FROM [id number]     Test Name Result Flag Reference   CREATININE, RANDOM URINE 107 mg/dL     MICROALBUMIN 0 7 mg/dL     Reference Range  Not established   MICROALBUMIN/CREATININE$RATIO, RANDOM URINE 7 mcg/mg creat  <30   The ADA defines abnormalities in albumin  excretion as follows:     Category         Result (mcg/mg creatinine)     Normal                    <30  Microalbuminuria            Clinical albuminuria   > OR = 300     The ADA recommends that at least two of three  specimens collected within a 3-6 month period be  abnormal before considering a patient to be  within a diagnostic category  (1) BASIC METABOLIC PROFILE [id number] 10:12AM Nicholas Diamond     Test Name Result Flag Reference   GLUCOSE 126 mg/dL H 65-99   Fasting reference interval     For someone without known diabetes, a glucose  value >125 mg/dL indicates that they may have  diabetes and this should be confirmed with a  follow-up test    UREA NITROGEN (BUN) 15 mg/dL  7-25   CREATININE 0 93 mg/dL H 0 60-0 88   For patients >52years of age, the reference limit  for Creatinine is approximately 13% higher for people  identified as -American  eGFR NON-AFR   AMERICAN 52 mL/min/1 73m2 L > OR = 60   eGFR AFRICAN AMERICAN 60 mL/min/1 73m2  > OR = 60   BUN/CREATININE RATIO 16 (calc)  6-22   SODIUM 139 mmol/L  135-146   POTASSIUM 4 4 mmol/L  3 5-5 3   CHLORIDE 101 mmol/L     CARBON DIOXIDE 31 mmol/L  20-31   CALCIUM 9 3 mg/dL  8 6-10 4     (Q) CBC (H/H, RBC, INDICES, WBC, PLT) [id number] 10:12AM Nicholas Diamond     Test Name Result Flag Reference   WHITE BLOOD CELL COUNT 6 5 Thousand/uL  3 8-10 8   RED BLOOD CELL COUNT 4 76 Million/uL  3 80-5 10   HEMOGLOBIN 14 0 g/dL  11 7-15 5   HEMATOCRIT 42 8 %  35 0-45 0   MCV 89 8 fL  80 0-100 0   MCH 29 5 pg  27 0-33 0   MCHC 32 8 g/dL  32 0-36 0   RDW 14 3 %  11 0-15 0   PLATELET COUNT 463 Thousand/uL  140-400   MPV 9 7 fL  7 5-12 5     (Q) HEMOGLOBIN A1c [id number] 10:12AM Albert Diamond   REPORT COMMENT:  FASTING:YES  PATIENT UNABLE TO VOID; ADVISED TO RETURN FOR COLLECTION  Test Name Result Flag Reference   HEMOGLOBIN A1c 6 0 % of total Hgb H <5 7   For someone without known diabetes, a hemoglobin   A1c value between 5 7% and 6 4% is consistent with  prediabetes and should be confirmed with a   follow-up test      For someone with known diabetes, a value <7%  indicates that their diabetes is well controlled  A1c  targets should be individualized based on duration of  diabetes, age, comorbid conditions, and other  considerations  This assay result is consistent with an increased risk  of diabetes  Currently, no consensus exists regarding use of  hemoglobin A1c for diagnosis of diabetes for children

## 2018-01-22 VITALS — HEART RATE: 61 BPM | SYSTOLIC BLOOD PRESSURE: 148 MMHG | HEIGHT: 60 IN | DIASTOLIC BLOOD PRESSURE: 76 MMHG

## 2018-11-29 DIAGNOSIS — R26.89 OTHER ABNORMALITIES OF GAIT AND MOBILITY: ICD-10-CM

## 2018-11-29 DIAGNOSIS — E11.40 TYPE 2 DIABETES MELLITUS WITH DIABETIC NEUROPATHY (HCC): ICD-10-CM

## 2018-11-29 DIAGNOSIS — S82.034A: ICD-10-CM

## 2018-11-29 DIAGNOSIS — E78.00 PURE HYPERCHOLESTEROLEMIA: ICD-10-CM

## 2018-11-29 DIAGNOSIS — Z00.00 ENCOUNTER FOR GENERAL ADULT MEDICAL EXAMINATION WITHOUT ABNORMAL FINDINGS: ICD-10-CM
